# Patient Record
Sex: FEMALE | Race: WHITE
[De-identification: names, ages, dates, MRNs, and addresses within clinical notes are randomized per-mention and may not be internally consistent; named-entity substitution may affect disease eponyms.]

---

## 2019-07-30 ENCOUNTER — HOSPITAL ENCOUNTER (OUTPATIENT)
Dept: HOSPITAL 61 - PCVCCLINIC | Age: 69
End: 2019-07-30
Attending: INTERNAL MEDICINE
Payer: COMMERCIAL

## 2019-07-30 DIAGNOSIS — E78.5: Primary | ICD-10-CM

## 2019-07-30 DIAGNOSIS — Z91.012: ICD-10-CM

## 2019-07-30 DIAGNOSIS — J44.9: ICD-10-CM

## 2019-07-30 DIAGNOSIS — Z79.899: ICD-10-CM

## 2019-07-30 DIAGNOSIS — I25.10: ICD-10-CM

## 2019-07-30 DIAGNOSIS — R55: ICD-10-CM

## 2019-07-30 DIAGNOSIS — I10: ICD-10-CM

## 2019-07-30 DIAGNOSIS — Z72.89: ICD-10-CM

## 2019-07-30 DIAGNOSIS — Z91.018: ICD-10-CM

## 2019-07-30 DIAGNOSIS — F17.200: ICD-10-CM

## 2019-07-30 PROCEDURE — 93005 ELECTROCARDIOGRAM TRACING: CPT

## 2019-07-30 PROCEDURE — G0463 HOSPITAL OUTPT CLINIC VISIT: HCPCS

## 2019-08-13 ENCOUNTER — HOSPITAL ENCOUNTER (OUTPATIENT)
Dept: HOSPITAL 61 - PCVCIMAG | Age: 69
Discharge: HOME | End: 2019-08-13
Attending: INTERNAL MEDICINE
Payer: COMMERCIAL

## 2019-08-13 DIAGNOSIS — F17.200: ICD-10-CM

## 2019-08-13 DIAGNOSIS — I07.1: Primary | ICD-10-CM

## 2019-08-13 DIAGNOSIS — I10: ICD-10-CM

## 2019-08-13 PROCEDURE — A9500 TC99M SESTAMIBI: HCPCS

## 2019-08-13 PROCEDURE — 93017 CV STRESS TEST TRACING ONLY: CPT

## 2019-08-13 PROCEDURE — 78452 HT MUSCLE IMAGE SPECT MULT: CPT

## 2019-08-13 PROCEDURE — 93306 TTE W/DOPPLER COMPLETE: CPT

## 2019-08-13 NOTE — PCVCIMAG
--------------- APPROVED REPORT --------------





Imaging Protocol: Rest Tc-99m/Stress Tc-99m 1 day

Study performed:  08/13/2019 09:19:25



Indication: Syncope

Patient Location: Out-Patient

Stress Nurse: Esperanza Redmond RN, CHRISTINA Cantu Tech:Cesar Granados [id number]



Ht: 5 ft 5 in Wt: 160 lbs BSA:  1.80 m2

HR: 66 bpm                      BP: 179/97 mmHg         BMI:  

26.6

Rhythm:  Sinus Rhythm



Medical History

Medical History: Age, Hyperlipidemia, HTN, Smoker

Medications: ASA, Atorvastatin, Lisinopril, Hydrocodone

Allergies: Bananas, Eggs

Pretest Chest Pain Characteristics: No chest pain

Exercise History: Sedentary



Resting Data

Rest SPECT myocardial perfusion imaging was performed in supine 

position 45 minutes following the intravenous injection of 12 mCi of 

Tc-99m Sestamibi.

Time of rest injection: 0830     Date: 08/13/2019

Administration Route: IV

Administration Site: Right Arm



Pharmacologic Stress

Pharmacologic stress test was performed by injecting Regadenoson 0.4 

mg IV push over 10-15 seconds immediately followed by the intravenous 

injection of 32.8 mCi of Tc-99m Sestamibi.

Time of stress injection: 0955     Date: 08/13/2019

Administration Route: IV

Administration Site: Right Arm

Gated Stress SPECT was performed 45 minutes after stress 

injection.

The images were gated to evaluate regional wall motion and calculate 

left ventricular ejection fraction. 



Stress Test Details

Stress Test:  Pharmacologic stress testing performed using 0.4 mg of 

regadenoson per 5 mL given IV over 10 seconds.

  Reason for pharmacologic stress test: Intermittent syncope.



HRMax Heart Rate (APMHR): 151 bpm 

Resting HR:            66 bpmTarget HR (85% APMHR): 128 bpm

Max HR Achieved:  116 bpm

% of APMHR:         76

Recovery HR:            94 bpm



BP

Resting BP:  179/97 mmHg

Max BP:       169/71 mmHg

Recovery BP:       166/79 mmHg

ECG

Resting ECG:  Sinus Rhythm

Stress ECG:     Sinus Tachycardia

Arrhythmia:    PVCs

Recovery ECG: Sinus Rhythm



Clinical

Reason for Termination: Completed protocol

Stress Symptoms: Dyspnea, Nausea, Headache, Lightheaded

Exercise duration:  min 55 sec

Symptoms resolved with caffeine.



Stress ECG Conclusion

1. Adequate response intravenous Lexiscan

2. Inadequate heart rate for ECG diagnosis



Study Data

Post stress, the left ventricular ejection was 78%..

SSS: 0

SRS: 0

SDS: 0

TID = 0.91.



Perfusion

There is a large area of severely reduced uptake in the entire 

segment of the inferior wall which is seen on the stress images as 

well as the resting images.

This area thickens and moves normally and is most consistent with 

attenuation artifact.



Wall Motion

Normal left ventricular wall motion.



Nuclear Conclusion

ECG Findings: non-diagnostic 

Clinical Findings: negative for ischemia 

Nuclear Findings: negative for ischemia 

Exercise Capacity: not assessed

Left Ventricular Function: normal 

1. Low risk study

2. Post exercise left ventricular ejection fraction of 78% without 

wall motion abnormalities 



Interpreted by:  Tracy Cunningham MD

Electronically Approved: 08/13/2019 16:17:55



<Conclusion>

1. Adequate response intravenous Lexiscan

2. Inadequate heart rate for ECG diagnosis

## 2019-08-13 NOTE — PCVCIMAG
--------------- APPROVED REPORT --------------





Study performed:  08/13/2019 07:42:04



EXAM: Comprehensive 2D, Doppler, and color-flow 

Echocardiogram

Patient Location: Echo lab

Status:  routine



BSA:         1.80

HR: 68 bpmBP:          122/84 mmHg

Rhythm: NSR



Other Information 

Study Quality: Adequate



Risk Factors: 

Cardiac Risk Factors:  HTN, Smoking



Indications

Syncope 



2D Dimensions

IVSd:  11.42 (7-11mm)

LVDd:  36.82 mm

PWd:  10.86 (7-11mm)Ascending Ao:  28.81 (22-36mm)

LVDs:  25.80 (25-40mm)

Left Atrium:  35.41 (27-40mm)

Aortic Root:  27.50 mm

LV Single Plane 4CH:  59.86 %

LV Single Plane 2CH:  51.94 %

Biplane EF:  56.8 %



Volumes

Left Atrial Volume (Systole)

Single Plane 4CH:  59.29 mLSingle Plane 2CH:  50.28 mL

LA ESV Index:  32.00 mL/m2



Aortic Valve

AoV Peak Florencio.:  1.60 m/s

AO Peak Gr.:  10.24 mmHgLVOT Max PG:  3.59 mmHg

LVOT Max V:  0.95 m/s



Mitral Valve

E/A Ratio:  0.8

MV Decel. Time:  220.50 ms

MV E Max Florencio.:  0.72 m/s

MV A Florencio.:  0.85 m/s

IVRT:  138.41 ms



Pulmonary Valve

PV Peak Florencio.:  0.91 m/sPV Peak Gr.:  3.29 mmHg



Pulmonary Vein

P Vein S:    0.30 m/sP Vein A:  0.38 m/s

P Vein D:   0.40 m/sP Vein A Dur.:  117.6 msec

P Vein S/D Ratio:  0.75



Tricuspid Valve

TR Peak Florencio.:  2.72 m/s

TR Peak Gr.:  29.67 mmHg

TV Vmax:  0.42 m/s



Left Ventricle

The left ventricle is normal size. There is normal LV segmental wall 

motion. There is normal left ventricular wall thickness. Left 

ventricular systolic function is normal. The left ventricular 

ejection fraction is within the normal range. LVEF is 60%. Grade I - 

abnormal relaxation pattern.



Right Ventricle

The right ventricle is normal size. The right ventricular systolic 

function is normal.



Atria

The left atrium size is normal. Possible small PFO is noted by color 

flow. Suggests microbubble contrast study The right atrium size is 

normal.



Aortic Valve

The aortic valve is normal in structure. No aortic regurgitation is 

present. There is no aortic valvular stenosis.



Mitral Valve

The mitral valve is normal in structure. Trace mitral regurgitation. 

No evidence of mitral valve stenosis.



Tricuspid Valve

The tricuspid valve is normal in structure. Mild tricuspid 

regurgitation with PAP of 37 mmHg.



Pulmonic Valve

The pulmonary valve is normal in structure. There is no pulmonic 

valvular regurgitation.



Great Vessels

The aortic root is normal in size. IVC is normal in size and 

collapses >50% with inspiration.



Pericardium

There is no pericardial effusion. There is no pleural 

effusion.



<Conclusion>

The left ventricle is normal size.

LVEF is 60%.

The aortic valve is normal in structure.

The mitral valve is normal in structure.

Trace mitral regurgitation.

The tricuspid valve is normal in structure.

Mild tricuspid regurgitation with PAP of 37 mmHg.

The pulmonary valve is normal in structure.

There is no pericardial effusion.

Possible small PFO is noted by color flow. Suggests microbubble 

contrast study

## 2019-08-29 ENCOUNTER — HOSPITAL ENCOUNTER (OUTPATIENT)
Dept: HOSPITAL 61 - PCVCCLINIC | Age: 69
Discharge: HOME | End: 2019-08-29
Attending: INTERNAL MEDICINE
Payer: COMMERCIAL

## 2019-08-29 DIAGNOSIS — R55: Primary | ICD-10-CM

## 2019-08-29 DIAGNOSIS — Z91.018: ICD-10-CM

## 2019-08-29 DIAGNOSIS — F17.200: ICD-10-CM

## 2019-08-29 DIAGNOSIS — Z91.012: ICD-10-CM

## 2019-08-29 DIAGNOSIS — I10: ICD-10-CM

## 2019-08-29 PROCEDURE — G0463 HOSPITAL OUTPT CLINIC VISIT: HCPCS
